# Patient Record
Sex: FEMALE | Race: WHITE | NOT HISPANIC OR LATINO | Employment: UNEMPLOYED | URBAN - METROPOLITAN AREA
[De-identification: names, ages, dates, MRNs, and addresses within clinical notes are randomized per-mention and may not be internally consistent; named-entity substitution may affect disease eponyms.]

---

## 2017-01-05 ENCOUNTER — HOSPITAL ENCOUNTER (OUTPATIENT)
Dept: ULTRASOUND IMAGING | Facility: HOSPITAL | Age: 57
Discharge: HOME | End: 2017-01-05

## 2017-01-05 ENCOUNTER — HOSPITAL ENCOUNTER (OUTPATIENT)
Dept: MAMMOGRAPHY | Facility: HOSPITAL | Age: 57
Discharge: HOME | End: 2017-01-05
Attending: SPECIALIST | Admitting: SPECIALIST

## 2017-01-05 DIAGNOSIS — R92.8 ABNORMAL MAMMOGRAM: ICD-10-CM

## 2017-01-05 PROCEDURE — G0279 TOMOSYNTHESIS, MAMMO: HCPCS

## 2017-01-05 PROCEDURE — 76641 ULTRASOUND BREAST COMPLETE: CPT | Performed by: RADIOLOGY

## 2017-01-05 PROCEDURE — 77061 BREAST TOMOSYNTHESIS UNI: CPT | Performed by: RADIOLOGY

## 2017-01-05 PROCEDURE — 76641 ULTRASOUND BREAST COMPLETE: CPT

## 2017-01-05 PROCEDURE — 77065 DX MAMMO INCL CAD UNI: CPT | Performed by: RADIOLOGY

## 2017-01-05 PROCEDURE — G0206 DX MAMMO INCL CAD UNI: HCPCS

## 2018-08-01 ENCOUNTER — TRANSCRIBE ORDERS (OUTPATIENT)
Dept: ADMINISTRATIVE | Facility: HOSPITAL | Age: 58
End: 2018-08-01

## 2018-08-01 DIAGNOSIS — Z12.31 VISIT FOR SCREENING MAMMOGRAM: Primary | ICD-10-CM

## 2018-09-05 ENCOUNTER — HOSPITAL ENCOUNTER (OUTPATIENT)
Dept: MAMMOGRAPHY | Facility: HOSPITAL | Age: 58
Discharge: HOME | End: 2018-09-05
Attending: SPECIALIST | Admitting: SPECIALIST

## 2018-09-05 DIAGNOSIS — Z12.31 VISIT FOR SCREENING MAMMOGRAM: ICD-10-CM

## 2018-09-05 PROCEDURE — 77067 SCR MAMMO BI INCL CAD: CPT | Performed by: RADIOLOGY

## 2018-09-05 PROCEDURE — 77063 BREAST TOMOSYNTHESIS BI: CPT

## 2018-09-05 PROCEDURE — 77067 SCR MAMMO BI INCL CAD: CPT

## 2018-09-05 PROCEDURE — 77063 BREAST TOMOSYNTHESIS BI: CPT | Performed by: RADIOLOGY

## 2020-06-05 ENCOUNTER — TRANSCRIBE ORDERS (OUTPATIENT)
Dept: ADMINISTRATIVE | Facility: HOSPITAL | Age: 60
End: 2020-06-05

## 2020-06-05 DIAGNOSIS — Z12.31 VISIT FOR SCREENING MAMMOGRAM: Primary | ICD-10-CM

## 2020-08-18 ENCOUNTER — HOSPITAL ENCOUNTER (OUTPATIENT)
Dept: MAMMOGRAPHY | Facility: HOSPITAL | Age: 60
Discharge: HOME | End: 2020-08-18
Admitting: SPECIALIST

## 2020-08-18 DIAGNOSIS — Z12.31 VISIT FOR SCREENING MAMMOGRAM: ICD-10-CM

## 2020-08-18 PROCEDURE — 77067 SCR MAMMO BI INCL CAD: CPT | Performed by: RADIOLOGY

## 2020-08-18 PROCEDURE — 77067 SCR MAMMO BI INCL CAD: CPT

## 2020-08-18 PROCEDURE — 77063 BREAST TOMOSYNTHESIS BI: CPT | Performed by: RADIOLOGY

## 2020-08-18 PROCEDURE — 77063 BREAST TOMOSYNTHESIS BI: CPT

## 2021-09-02 ENCOUNTER — TRANSCRIBE ORDERS (OUTPATIENT)
Dept: ADMINISTRATIVE | Facility: HOSPITAL | Age: 61
End: 2021-09-02

## 2021-09-02 DIAGNOSIS — Z12.31 VISIT FOR SCREENING MAMMOGRAM: Primary | ICD-10-CM

## 2021-10-01 ENCOUNTER — HOSPITAL ENCOUNTER (OUTPATIENT)
Dept: MAMMOGRAPHY | Facility: HOSPITAL | Age: 61
Discharge: HOME | End: 2021-10-01
Admitting: FAMILY MEDICINE

## 2021-10-01 DIAGNOSIS — Z12.31 VISIT FOR SCREENING MAMMOGRAM: ICD-10-CM

## 2021-10-01 PROCEDURE — 77063 BREAST TOMOSYNTHESIS BI: CPT

## 2021-10-01 PROCEDURE — 77067 SCR MAMMO BI INCL CAD: CPT | Performed by: RADIOLOGY

## 2021-10-01 PROCEDURE — 77067 SCR MAMMO BI INCL CAD: CPT

## 2021-10-01 PROCEDURE — 77063 BREAST TOMOSYNTHESIS BI: CPT | Performed by: RADIOLOGY

## 2021-11-03 ENCOUNTER — HOSPITAL ENCOUNTER (OUTPATIENT)
Dept: ULTRASOUND IMAGING | Facility: HOSPITAL | Age: 61
Discharge: HOME | End: 2021-11-03

## 2021-11-03 ENCOUNTER — HOSPITAL ENCOUNTER (OUTPATIENT)
Dept: MAMMOGRAPHY | Facility: HOSPITAL | Age: 61
Discharge: HOME | End: 2021-11-03

## 2021-11-03 DIAGNOSIS — R92.8 ABNORMAL MAMMOGRAM: ICD-10-CM

## 2021-11-03 PROCEDURE — 76642 ULTRASOUND BREAST LIMITED: CPT | Performed by: RADIOLOGY

## 2021-11-03 PROCEDURE — 77066 DX MAMMO INCL CAD BI: CPT

## 2021-11-03 PROCEDURE — 77066 DX MAMMO INCL CAD BI: CPT | Performed by: RADIOLOGY

## 2021-11-03 PROCEDURE — 76642 ULTRASOUND BREAST LIMITED: CPT

## 2021-11-03 PROCEDURE — G0279 TOMOSYNTHESIS, MAMMO: HCPCS

## 2021-11-03 PROCEDURE — 77062 BREAST TOMOSYNTHESIS BI: CPT | Performed by: RADIOLOGY

## 2023-01-16 ENCOUNTER — TRANSCRIBE ORDERS (OUTPATIENT)
Dept: ADMINISTRATIVE | Facility: HOSPITAL | Age: 63
End: 2023-01-16
Payer: COMMERCIAL

## 2023-01-16 DIAGNOSIS — Z12.31 VISIT FOR SCREENING MAMMOGRAM: Primary | ICD-10-CM

## 2023-02-15 ENCOUNTER — HOSPITAL ENCOUNTER (OUTPATIENT)
Dept: MAMMOGRAPHY | Facility: HOSPITAL | Age: 63
Discharge: HOME | End: 2023-02-15
Admitting: FAMILY MEDICINE
Payer: COMMERCIAL

## 2023-02-15 DIAGNOSIS — Z12.31 VISIT FOR SCREENING MAMMOGRAM: ICD-10-CM

## 2023-02-15 PROCEDURE — 77067 SCR MAMMO BI INCL CAD: CPT | Performed by: RADIOLOGY

## 2023-02-15 PROCEDURE — 77063 BREAST TOMOSYNTHESIS BI: CPT | Performed by: RADIOLOGY

## 2023-02-15 PROCEDURE — 77067 SCR MAMMO BI INCL CAD: CPT

## 2023-02-15 PROCEDURE — 77063 BREAST TOMOSYNTHESIS BI: CPT

## 2024-03-22 ENCOUNTER — LAB (OUTPATIENT)
Dept: LAB | Facility: HOSPITAL | Age: 64
End: 2024-03-22
Payer: COMMERCIAL

## 2024-03-22 ENCOUNTER — OFFICE VISIT (OUTPATIENT)
Dept: GYNECOLOGIC ONCOLOGY | Facility: CLINIC | Age: 64
End: 2024-03-22
Payer: COMMERCIAL

## 2024-03-22 VITALS
DIASTOLIC BLOOD PRESSURE: 89 MMHG | SYSTOLIC BLOOD PRESSURE: 167 MMHG | HEIGHT: 63 IN | WEIGHT: 105.2 LBS | TEMPERATURE: 97.3 F | HEART RATE: 79 BPM | OXYGEN SATURATION: 100 % | RESPIRATION RATE: 18 BRPM | BODY MASS INDEX: 18.64 KG/M2

## 2024-03-22 DIAGNOSIS — N94.89 TUBO-OVARIAN MASS: Primary | ICD-10-CM

## 2024-03-22 DIAGNOSIS — N94.89 TUBO-OVARIAN MASS: ICD-10-CM

## 2024-03-22 LAB — CANCER AG125 SERPL QL: 9.8 U/ML (ref 0–38.1)

## 2024-03-22 PROCEDURE — 36415 COLL VENOUS BLD VENIPUNCTURE: CPT

## 2024-03-22 PROCEDURE — 86304 IMMUNOASSAY TUMOR CA 125: CPT

## 2024-03-22 PROCEDURE — 99203 OFFICE O/P NEW LOW 30 MIN: CPT | Performed by: OBSTETRICS & GYNECOLOGY

## 2024-03-22 RX ORDER — ESTRADIOL 0.05 MG/D
1 PATCH, EXTENDED RELEASE TRANSDERMAL WEEKLY
COMMUNITY

## 2024-03-22 RX ORDER — LEVOTHYROXINE SODIUM 0.07 MG/1
75 TABLET ORAL DAILY
COMMUNITY

## 2024-03-22 RX ORDER — METOPROLOL SUCCINATE 25 MG/1
25 TABLET, EXTENDED RELEASE ORAL DAILY
COMMUNITY

## 2024-03-22 RX ORDER — PROGESTERONE 200 MG/1
200 CAPSULE ORAL DAILY
COMMUNITY

## 2024-03-22 RX ORDER — CYCLOSPORINE 0.5 MG/ML
1 EMULSION OPHTHALMIC EVERY 12 HOURS
COMMUNITY
Start: 2024-02-10

## 2024-03-22 NOTE — PROGRESS NOTES
Ingrid Ha  [id number]  1960      Reason for visit:  ovarian cyst    Consultation:  Patient is being seen at the request of Dr. Ha      History of present illness:  The patient is a 63 y.o. year old female who presents today for treatment and evaluation of the above issues.    Patient has hx of endometriosis and ovarian cysts. She had multiple dx lap with excision for endometriosis as she was trying to conceive children. Ovarian cysts have always been small and managed conservatively, she has never had medical or surgical management. She is on estrogen patch for menopause symptoms including bladder irritation (no culture proven UTI). She takes progesterone q3 months for endometrial protection.     She notes that she had some left sided abdominal pain for which she had TVUS completed. Ultrasound demonstrated normal appearing uterus, but mildly increased in size ovarian cysts. Comparison imaging in 2024. In January, R ovary normal. L ovary with 1.6 x 2.6 cm cyst, 2.1 x 12 cm cyst, and 0.9 x 1.9 cm cyst. In 2024, R ovary normal, left ovary with mildly increasing cyst, one measuring 2.7 x 1.1 cm and 2.5 x 2.0 cm.     Other than some mild pain, she is asymptomatic. Denies changes in weight, appetite, bowel/bladder function. Denies chest pain/SOB. Denies vaginal bleeding.     For new patients, PFS intake form from was reviewed and confirmed.    OBGYN History:  She is a .  She does use HRT, estrogen patch and prometrium q3 months for endometrial protection. She does have a history of abnormal pap smears with LEEP in , she had negative pap smear 10/11/21. No FH of ovarian/breast/colon cancer.       Oncologic History:  Oncology/Hematology History    No history exists.         Past Medical History:   Diagnosis Date    Hypothyroidism     PVC (premature ventricular contraction)        Past Surgical History:   Procedure Laterality Date     SECTION  1993    LAPAROSCOPIC  "LYSIS OF ADHESIONS  1990       MEDICATIONS:    Current Outpatient Medications:     cycloSPORINE (RESTASIS) 0.05 % ophthalmic emulsion, Administer 1 drop to both eyes Every 12 (Twelve) Hours., Disp: , Rfl:     estradiol (MINIVELLE, VIVELLE-DOT) 0.05 MG/24HR patch, Place 1 patch on the skin as directed by provider 1 (One) Time Per Week. 2 times a week, Disp: , Rfl:     levothyroxine (SYNTHROID, LEVOTHROID) 75 MCG tablet, Take 1 tablet by mouth Daily., Disp: , Rfl:     metoprolol succinate XL (TOPROL-XL) 25 MG 24 hr tablet, Take 1 tablet by mouth Daily., Disp: , Rfl:     Progesterone (PROMETRIUM) 200 MG capsule, Take 1 capsule by mouth Daily. 10 days every 3 month, Disp: , Rfl:      Allergies:  is allergic to valacyclovir.    Social History:   Social History     Socioeconomic History    Marital status:        Family History:    Family History   Problem Relation Age of Onset    Breast cancer Neg Hx     Ovarian cancer Neg Hx        Health Maintenance:    Health Maintenance   Topic Date Due    COLORECTAL CANCER SCREENING  Never done    RSV Vaccine - Adults (1 - 1-dose 60+ series) Never done    TDAP/TD VACCINES (2 - Td or Tdap) 03/25/2023    INFLUENZA VACCINE  08/01/2023    COVID-19 Vaccine (4 - 2023-24 season) 09/01/2023    HEPATITIS C SCREENING  Never done    ANNUAL PHYSICAL  Never done    PAP SMEAR  Never done    MAMMOGRAM  02/15/2025    ZOSTER VACCINE  Completed    Pneumococcal Vaccine 0-64  Aged Out         Review of Systems:   All other systems were reviewed and are negative except as mentioned above.    Physical Exam    Vitals:    03/22/24 1024   BP: 167/89   Pulse: 79   Resp: 18   Temp: 97.3 °F (36.3 °C)   TempSrc: Temporal   SpO2: 100%   Weight: 47.7 kg (105 lb 3.2 oz)   Height: 160 cm (63\")   PainSc: 0-No pain       Body mass index is 18.64 kg/m².    Wt Readings from Last 3 Encounters:   03/22/24 47.7 kg (105 lb 3.2 oz)       GENERAL: Alert, well-appearing female appearing her stated age who is in no " "apparent distress.   HEENT: Sclera anicteric. Head normocephalic, atraumatic. Mucus membranes moist.   NECK: Trachea midline, supple, without masses.  No thyromegaly.   BREASTS: Deferred  CARDIOVASCULAR: Normal rate, regular rhythm, no murmurs, rubs, or gallops.  No peripheral edema.  RESPIRATORY: Clear to auscultation bilaterally, normal respiratory effort  BACK:  No CVA tenderness, no vertebral tenderness on palpation  GASTROINTESTINAL:  Abdomen is soft, non-tender, non-distended, no rebound or guarding, no masses, or hernias. No HSM.    SKIN:  Warm, dry, well-perfused.  All visible areas intact.  No rashes, lesions, ulcers.  PSYCHIATRIC: AO x3, with appropriate affect, normal thought processes.  NEUROLOGIC: No focal deficits.  Moves extremities well.  MUSCULOSKELETAL: Normal gait and station.   EXTREMITIES:   No cyanosis, clubbing, symmetric.  LYMPHATICS:  No cervical or inguinal adenopathy noted.     PELVIC exam:     On bimanual examination no mass was appreciated.  Uterus was normal in size and shape. There is no cervical motion or uterine tenderness. No cervical mass was palpated. Parametria were smooth. Rectovaginal exam was deferred.     ECOG PS 0    PROCEDURES:  None    Diagnostic Data:      No Images in the past 120 days found..    No results found for: \"WBC\", \"HGB\", \"HCT\", \"MCV\", \"PLT\", \"NEUTROABS\", \"GLUCOSE\", \"BUN\", \"CREATININE\", \"EGFRIFNONA\", \"EGFRIFAFRI\", \"NA\", \"K\", \"CL\", \"CO2\", \"MG\", \"PHOS\", \"CALCIUM\", \"ALBUMIN\", \"AST\", \"ALT\", \"BILITOT\"  Lab Results   Component Value Date     9.8 03/22/2024           Assessment & Plan   This is a 63 y.o. woman with hx of endometriosis presenting for consultation for ovarian cysts.     Ovarian cysts   -hx of endometriosis, s/p multiple dx lap for excision   -now on estrogen HRT for hx of bladder discomfort (estrogen patches with cyclic withdrawal with Prometrium every 3 months)  - known hx of cysts, previously managed conservatively   - new pain so repeat TVUS was " completed   -Comparison imaging in January 2024. In January, R ovary normal. L ovary with 1.6 x 2.6 cm cyst, 2.1 x 12 cm cyst, and 0.9 x 1.9 cm cyst. In March 2024, R ovary normal, left ovary with mildly increasing cyst, one measuring 2.7 x 1.1 cm and 2.5 x 2.0 cm.  -no  available   -Dr. Bustamante reviewed images from Dr. Cruz clinic with patient, no concerning image or exam findings .  Images are now scanned into the EMR which are of better quality than the initial received images which were faxed over.  It somewhat difficult to tell if the increase in size is an adjacent cyst not previously included in the ovarian measurements.  Regardless, conservative management is indicated.  There were no findings of free fluid or other concerning findings regarding malignancy.  -discussed that patient could trial off of HRT to see if cyst size decreases, patient agreeable  -recommended obtaining  and repeat imaging in 3 months. Patient to call if she experiences worsening abdominal pain, changes to appetite or bowel/bladder habits, weight loss     Encounter Diagnosis   Name Primary?    Tubo-ovarian mass Yes       Pain assessment was performed today as a part of patient’s care.  For patients with pain related to surgery, gynecologic malignancy or cancer treatment, the plan is as noted in the assessment/plan.  For patients with pain not related to these issues, they are to seek any further needed care from a more appropriate provider, such as PCP.      Orders Placed This Encounter   Procedures    US Non-ob Transvaginal     Standing Status:   Future     Number of Occurrences:   1     Standing Expiration Date:   3/22/2025     Order Specific Question:   Reason for Exam:     Answer:   ovarian mass     Order Specific Question:   Release to patient     Answer:   Routine Release [[id number]]    IMAGING SCANNED    IMAGING SCANNED    IMAGING SCANNED         Standing Status:   Future     Number of Occurrences:   1      Standing Expiration Date:   3/22/2025     Order Specific Question:   Release to patient     Answer:   Routine Release [[id number]]       FOLLOW UP: 3 months    Melanie Paul MD   PGY3- OBGYN Resident   [facility]    I spent 35 minutes caring for Ingrid on this date of service. This time includes time spent by me in the following activities: preparing for the visit, reviewing tests, performing a medically appropriate examination and/or evaluation, counseling and educating the patient/family/caregiver, ordering medications, tests, or procedures, referring and communicating with other health care professionals, documenting information in the medical record, independently interpreting results and communicating that information with the patient/family/caregiver, and care coordination    Patient was seen and examined with Dr. Paul,  resident, who performed portions of the examination and documentation for this patient's care under my direct supervision.  I agree with the above documentation and plan.    Mandy Bustamante MD  03/24/24  16:34 EDT

## 2024-03-25 ENCOUNTER — TELEPHONE (OUTPATIENT)
Dept: GYNECOLOGIC ONCOLOGY | Facility: CLINIC | Age: 64
End: 2024-03-25
Payer: COMMERCIAL

## 2024-03-25 NOTE — TELEPHONE ENCOUNTER
----- Message from Mandy Bustamante MD sent at 3/23/2024  9:48 AM EDT -----  Please notify patient of normal ca 125  Thanks!  ----- Message -----  From: Lab, Background User  Sent: 3/22/2024   7:27 PM EDT  To: Mandy Bustamante MD

## 2024-04-10 ENCOUNTER — TRANSCRIBE ORDERS (OUTPATIENT)
Dept: ADMINISTRATIVE | Facility: HOSPITAL | Age: 64
End: 2024-04-10
Payer: COMMERCIAL

## 2024-04-10 DIAGNOSIS — Z12.31 VISIT FOR SCREENING MAMMOGRAM: Primary | ICD-10-CM

## 2024-05-15 ENCOUNTER — HOSPITAL ENCOUNTER (OUTPATIENT)
Dept: MAMMOGRAPHY | Facility: HOSPITAL | Age: 64
Discharge: HOME | End: 2024-05-15
Admitting: FAMILY MEDICINE
Payer: COMMERCIAL

## 2024-05-15 DIAGNOSIS — Z12.31 VISIT FOR SCREENING MAMMOGRAM: ICD-10-CM

## 2024-05-15 PROCEDURE — 77063 BREAST TOMOSYNTHESIS BI: CPT

## 2024-05-15 PROCEDURE — 77067 SCR MAMMO BI INCL CAD: CPT

## 2024-05-16 PROCEDURE — 77067 SCR MAMMO BI INCL CAD: CPT | Performed by: RADIOLOGY

## 2024-05-16 PROCEDURE — 77063 BREAST TOMOSYNTHESIS BI: CPT | Performed by: RADIOLOGY

## 2024-05-22 ENCOUNTER — LAB (OUTPATIENT)
Dept: LAB | Facility: HOSPITAL | Age: 64
End: 2024-05-22
Payer: COMMERCIAL

## 2024-05-22 ENCOUNTER — TRANSCRIBE ORDERS (OUTPATIENT)
Dept: LAB | Facility: HOSPITAL | Age: 64
End: 2024-05-22
Payer: COMMERCIAL

## 2024-05-22 DIAGNOSIS — K13.70 MOUTH LESION: Primary | ICD-10-CM

## 2024-05-22 DIAGNOSIS — K13.70 MOUTH LESION: ICD-10-CM

## 2024-05-22 LAB
ALBUMIN SERPL-MCNC: 4.3 G/DL (ref 3.5–5.2)
ALBUMIN/GLOB SERPL: 1.6 G/DL
ALP SERPL-CCNC: 49 U/L (ref 39–117)
ALT SERPL W P-5'-P-CCNC: 30 U/L (ref 1–33)
ANION GAP SERPL CALCULATED.3IONS-SCNC: 8 MMOL/L (ref 5–15)
AST SERPL-CCNC: 21 U/L (ref 1–32)
BASOPHILS # BLD AUTO: 0.03 10*3/MM3 (ref 0–0.2)
BASOPHILS NFR BLD AUTO: 0.7 % (ref 0–1.5)
BILIRUB SERPL-MCNC: 0.5 MG/DL (ref 0–1.2)
BUN SERPL-MCNC: 13 MG/DL (ref 8–23)
BUN/CREAT SERPL: 15.1 (ref 7–25)
CALCIUM SPEC-SCNC: 10.2 MG/DL (ref 8.6–10.5)
CHLORIDE SERPL-SCNC: 104 MMOL/L (ref 98–107)
CHROMATIN AB SERPL-ACNC: <10 IU/ML (ref 0–14)
CO2 SERPL-SCNC: 29 MMOL/L (ref 22–29)
CREAT SERPL-MCNC: 0.86 MG/DL (ref 0.57–1)
CRP SERPL-MCNC: <0.3 MG/DL (ref 0–0.5)
DEPRECATED RDW RBC AUTO: 43.7 FL (ref 37–54)
EGFRCR SERPLBLD CKD-EPI 2021: 76 ML/MIN/1.73
EOSINOPHIL # BLD AUTO: 0.01 10*3/MM3 (ref 0–0.4)
EOSINOPHIL NFR BLD AUTO: 0.2 % (ref 0.3–6.2)
ERYTHROCYTE [DISTWIDTH] IN BLOOD BY AUTOMATED COUNT: 11.9 % (ref 12.3–15.4)
ERYTHROCYTE [SEDIMENTATION RATE] IN BLOOD: 9 MM/HR (ref 0–30)
GLOBULIN UR ELPH-MCNC: 2.7 GM/DL
GLUCOSE SERPL-MCNC: 150 MG/DL (ref 65–99)
HCT VFR BLD AUTO: 37 % (ref 34–46.6)
HGB BLD-MCNC: 12.5 G/DL (ref 12–15.9)
IMM GRANULOCYTES # BLD AUTO: 0.01 10*3/MM3 (ref 0–0.05)
IMM GRANULOCYTES NFR BLD AUTO: 0.2 % (ref 0–0.5)
LYMPHOCYTES # BLD AUTO: 1.34 10*3/MM3 (ref 0.7–3.1)
LYMPHOCYTES NFR BLD AUTO: 30 % (ref 19.6–45.3)
MCH RBC QN AUTO: 33.7 PG (ref 26.6–33)
MCHC RBC AUTO-ENTMCNC: 33.8 G/DL (ref 31.5–35.7)
MCV RBC AUTO: 99.7 FL (ref 79–97)
MONOCYTES # BLD AUTO: 0.31 10*3/MM3 (ref 0.1–0.9)
MONOCYTES NFR BLD AUTO: 7 % (ref 5–12)
NEUTROPHILS NFR BLD AUTO: 2.76 10*3/MM3 (ref 1.7–7)
NEUTROPHILS NFR BLD AUTO: 61.9 % (ref 42.7–76)
NRBC BLD AUTO-RTO: 0 /100 WBC (ref 0–0.2)
PLATELET # BLD AUTO: 205 10*3/MM3 (ref 140–450)
PMV BLD AUTO: 10.8 FL (ref 6–12)
POTASSIUM SERPL-SCNC: 3.5 MMOL/L (ref 3.5–5.2)
PROT SERPL-MCNC: 7 G/DL (ref 6–8.5)
RBC # BLD AUTO: 3.71 10*6/MM3 (ref 3.77–5.28)
SODIUM SERPL-SCNC: 141 MMOL/L (ref 136–145)
WBC NRBC COR # BLD AUTO: 4.46 10*3/MM3 (ref 3.4–10.8)

## 2024-05-22 PROCEDURE — 85652 RBC SED RATE AUTOMATED: CPT

## 2024-05-22 PROCEDURE — 86037 ANCA TITER EACH ANTIBODY: CPT

## 2024-05-22 PROCEDURE — 85025 COMPLETE CBC W/AUTO DIFF WBC: CPT

## 2024-05-22 PROCEDURE — 86431 RHEUMATOID FACTOR QUANT: CPT

## 2024-05-22 PROCEDURE — 36415 COLL VENOUS BLD VENIPUNCTURE: CPT

## 2024-05-22 PROCEDURE — 80053 COMPREHEN METABOLIC PANEL: CPT

## 2024-05-22 PROCEDURE — 83516 IMMUNOASSAY NONANTIBODY: CPT

## 2024-05-22 PROCEDURE — 86038 ANTINUCLEAR ANTIBODIES: CPT

## 2024-05-22 PROCEDURE — 86140 C-REACTIVE PROTEIN: CPT

## 2024-05-23 ENCOUNTER — TELEPHONE (OUTPATIENT)
Dept: GYNECOLOGIC ONCOLOGY | Facility: CLINIC | Age: 64
End: 2024-05-23
Payer: COMMERCIAL

## 2024-05-23 LAB — ANA SER QL: NEGATIVE

## 2024-05-23 NOTE — TELEPHONE ENCOUNTER
Caller: Ingrid Ha    Relationship to patient: Self    Best call back number: [phone]    Chief complaint: R/S    Type of visit: ULTRASOUND     Requested date: 6-27, 6-28, 7-5, 7-8, 7-9, 7-11, 7-12    If rescheduling, when is the original appointment: 6-

## 2024-05-24 LAB
C-ANCA TITR SER IF: NORMAL TITER
MYELOPEROXIDASE AB SER IA-ACNC: <0.2 UNITS (ref 0–0.9)
P-ANCA ATYPICAL TITR SER IF: NORMAL TITER
P-ANCA TITR SER IF: NORMAL TITER
PROTEINASE3 AB SER IA-ACNC: <0.2 UNITS (ref 0–0.9)

## 2024-06-26 NOTE — PROGRESS NOTES
"Ingrid Mahajan Leland  [id number]  1960      Reason for visit:  ovarian cyst      History of present illness:  The patient is a 63 y.o. year old female who presents today for treatment and evaluation of the above issues.  From prior note 3/22/2024:  \"Patient has hx of endometriosis and ovarian cysts. She had multiple dx lap with excision for endometriosis as she was trying to conceive children. Ovarian cysts have always been small and managed conservatively, she has never had medical or surgical management. She is on estrogen patch for menopause symptoms including bladder irritation (no culture proven UTI). She takes progesterone q3 months for endometrial protection.     She notes that she had some left sided abdominal pain for which she had TVUS completed. Ultrasound demonstrated normal appearing uterus, but mildly increased in size ovarian cysts. Comparison imaging in 2024. In January, R ovary normal. L ovary with 1.6 x 2.6 cm cyst, 2.1 x 12 cm cyst, and 0.9 x 1.9 cm cyst. In 2024, R ovary normal, left ovary with mildly increasing cyst, one measuring 2.7 x 1.1 cm and 2.5 x 2.0 cm.\"  =========================  Since last appt, notes no mild pain, she is asymptomatic. Denies changes in weight, appetite, bowel/bladder function. Denies chest pain/SOB. Notes minimal dark spotting over last 2 months.  She has not resumed hormone replacement since last appt .      Oncologic History:  Oncology/Hematology History    No history exists.         Past Medical History:   Diagnosis Date    Hypothyroidism     PVC (premature ventricular contraction)        Past Surgical History:   Procedure Laterality Date     SECTION  1993    LAPAROSCOPIC LYSIS OF ADHESIONS         MEDICATIONS:    Current Outpatient Medications:     cycloSPORINE (RESTASIS) 0.05 % ophthalmic emulsion, Administer 1 drop to both eyes Every 12 (Twelve) Hours., Disp: , Rfl:     levothyroxine (SYNTHROID, LEVOTHROID) 75 MCG tablet, Take " "1 tablet by mouth Daily., Disp: , Rfl:     metoprolol succinate XL (TOPROL-XL) 25 MG 24 hr tablet, Take 1 tablet by mouth Daily., Disp: , Rfl:      Allergies:  is allergic to valacyclovir.    Social History:   Social History     Socioeconomic History    Marital status:        Family History:    Family History   Problem Relation Age of Onset    Breast cancer Neg Hx     Ovarian cancer Neg Hx        Health Maintenance:    Health Maintenance   Topic Date Due    BMI FOLLOWUP  Never done    COLORECTAL CANCER SCREENING  Never done    RSV Vaccine - Adults (1 - 1-dose 60+ series) Never done    TDAP/TD VACCINES (2 - Td or Tdap) 03/25/2023    COVID-19 Vaccine (4 - 2023-24 season) 09/01/2023    HEPATITIS C SCREENING  Never done    ANNUAL PHYSICAL  Never done    PAP SMEAR  Never done    INFLUENZA VACCINE  08/01/2024    MAMMOGRAM  05/15/2026    ZOSTER VACCINE  Completed    Pneumococcal Vaccine 0-64  Aged Out         Review of Systems:   All other systems were reviewed and are negative except as mentioned above.    Physical Exam    Vitals:    06/28/24 1047   BP: 178/88   Pulse: 68   Resp: 18   Temp: 97.1 °F (36.2 °C)   TempSrc: Infrared   SpO2: 100%   Weight: 46.7 kg (102 lb 14.4 oz)   Height: 160 cm (63\")   PainSc: 0-No pain         Body mass index is 18.23 kg/m².    Wt Readings from Last 3 Encounters:   06/28/24 46.7 kg (102 lb 14.4 oz)   03/22/24 47.7 kg (105 lb 3.2 oz)       GENERAL: Alert, well-appearing female appearing her stated age who is in no apparent distress.   HEENT: Sclera anicteric. Head normocephalic, atraumatic. Mucus membranes moist.   BREASTS: Deferred  CARDIOVASCULAR:  No peripheral edema.  RESPIRATORY:  normal respiratory effort  GASTROINTESTINAL:  Abdomen is soft, non-tender, non-distended, no rebound or guarding, no masses, or hernias.   SKIN:  Warm, dry, well-perfused.  All visible areas intact.  No rashes, lesions, ulcers.  PSYCHIATRIC: AO x3, with appropriate affect, normal thought " processes.  NEUROLOGIC: No focal deficits.  Moves extremities well.  MUSCULOSKELETAL: Normal gait and station.   EXTREMITIES:   No cyanosis, clubbing, symmetric.     PELVIC exam: Deferred    ECOG PS 0    PROCEDURES: Transvaginal ultrasound was performed.  Please refer to report.    Diagnostic Data:      No Images in the past 120 days found..    Lab Results   Component Value Date    WBC 4.46 05/22/2024    HGB 12.5 05/22/2024    HCT 37.0 05/22/2024    MCV 99.7 (H) 05/22/2024     05/22/2024    NEUTROABS 2.76 05/22/2024    GLUCOSE 150 (H) 05/22/2024    BUN 13 05/22/2024    CREATININE 0.86 05/22/2024     05/22/2024    K 3.5 05/22/2024     05/22/2024    CO2 29.0 05/22/2024    CALCIUM 10.2 05/22/2024    ALBUMIN 4.3 05/22/2024    AST 21 05/22/2024    ALT 30 05/22/2024    BILITOT 0.5 05/22/2024     Lab Results   Component Value Date     12.8 06/28/2024     9.8 03/22/2024           Assessment & Plan   This is a 63 y.o. woman with history of endometriosis and hormone replacement presenting for consultation for ovarian cysts.   Encounter Diagnoses   Name Primary?    Tubo-ovarian mass Yes    History of endometriosis     History of postmenopausal HRT        Ovarian cysts   -hx of endometriosis, s/p multiple dx lap for excision   -Previously on estrogen HRT for hx of bladder discomfort (estrogen patches with cyclic withdrawal with Prometrium every 3 months) and patient was counseled to discontinue at last visit March 2024  -known hx of cysts, previously managed conservatively   -Comparison imaging in January 2024. In January, R ovary normal. L ovary with 1.6 x 2.6 cm cyst, 2.1 x 12 cm cyst, and 0.9 x 1.9 cm cyst. In March 2024, R ovary normal, left ovary with mildly increasing cyst, one measuring 2.7 x 1.1 cm and 2.5 x 2.0 cm.  -Repeat ultrasound today shows 5mm stripe, ovaries appear wnl. Discussed that with PMB will need endometrial sampling at next appt if PMB persists 3 months from now.  She is to  call for an appointment if needed.  Dr. Bustamante discussed this with referring physician.  Both agreed this was a reasonable approach.  However, if patient's symptoms should worsen she should come back in a sooner timeframe.    Pain assessment was performed today as a part of patient’s care.  For patients with pain related to surgery, gynecologic malignancy or cancer treatment, the plan is as noted in the assessment/plan.  For patients with pain not related to these issues, they are to seek any further needed care from a more appropriate provider, such as PCP.      Orders Placed This Encounter   Procedures    US Non-ob Transvaginal     Standing Status:   Future     Number of Occurrences:   1     Standing Expiration Date:   6/27/2025     Order Specific Question:   Reason for Exam:     Answer:   follow up of ovarian cyst     Order Specific Question:   Release to patient     Answer:   Routine Release [[id number]]         Standing Status:   Future     Number of Occurrences:   1     Standing Expiration Date:   6/27/2025     Order Specific Question:   Release to patient     Answer:   Routine Release [[id number]]     FOLLOW UP: MAYELIN Vigil MD   PGY3- OBGYN Resident   [facility]    I spent 14 minutes caring for Ingrid on this date of service. This time includes time spent by me in the following activities: preparing for the visit, reviewing tests, performing a medically appropriate examination and/or evaluation, counseling and educating the patient/family/caregiver, referring and communicating with other health care professionals, documenting information in the medical record, care coordination, and ordering procedure(s)  I spent 16 minutes on the separately reported service of transvaginal ultrasound. This time is not included in the time used to support the E/M service also reported today.    Patient was seen and examined with Dr. Vigil,  resident, who performed portions of  the examination and documentation for this patient's care under my direct supervision.  I agree with the above documentation and plan.    Mandy Bustamante MD  06/30/24  18:32 EDT

## 2024-06-27 PROBLEM — Z92.29 HISTORY OF POSTMENOPAUSAL HRT: Status: ACTIVE | Noted: 2024-06-27

## 2024-06-27 PROBLEM — Z87.42 HISTORY OF ENDOMETRIOSIS: Status: ACTIVE | Noted: 2024-06-27

## 2024-06-28 ENCOUNTER — TELEPHONE (OUTPATIENT)
Dept: GYNECOLOGIC ONCOLOGY | Facility: CLINIC | Age: 64
End: 2024-06-28

## 2024-06-28 ENCOUNTER — OFFICE VISIT (OUTPATIENT)
Dept: GYNECOLOGIC ONCOLOGY | Facility: CLINIC | Age: 64
End: 2024-06-28
Payer: COMMERCIAL

## 2024-06-28 ENCOUNTER — LAB (OUTPATIENT)
Dept: LAB | Facility: HOSPITAL | Age: 64
End: 2024-06-28
Payer: COMMERCIAL

## 2024-06-28 VITALS
WEIGHT: 102.9 LBS | DIASTOLIC BLOOD PRESSURE: 88 MMHG | TEMPERATURE: 97.1 F | OXYGEN SATURATION: 100 % | HEART RATE: 68 BPM | BODY MASS INDEX: 18.23 KG/M2 | SYSTOLIC BLOOD PRESSURE: 178 MMHG | HEIGHT: 63 IN | RESPIRATION RATE: 18 BRPM

## 2024-06-28 DIAGNOSIS — N94.89 TUBO-OVARIAN MASS: Primary | ICD-10-CM

## 2024-06-28 DIAGNOSIS — Z87.42 HISTORY OF ENDOMETRIOSIS: ICD-10-CM

## 2024-06-28 DIAGNOSIS — Z92.29 HISTORY OF POSTMENOPAUSAL HRT: ICD-10-CM

## 2024-06-28 DIAGNOSIS — N94.89 TUBO-OVARIAN MASS: ICD-10-CM

## 2024-06-28 LAB — CANCER AG125 SERPL QL: 12.8 U/ML (ref 0–38.1)

## 2024-06-28 PROCEDURE — 36415 COLL VENOUS BLD VENIPUNCTURE: CPT

## 2024-06-28 PROCEDURE — 86304 IMMUNOASSAY TUMOR CA 125: CPT

## 2024-06-28 PROCEDURE — 99212 OFFICE O/P EST SF 10 MIN: CPT | Performed by: OBSTETRICS & GYNECOLOGY

## 2024-06-28 NOTE — TELEPHONE ENCOUNTER
RN called and left voice mail about having lab results. Rn left office number. Someone from the clinical staff will attempt to call patient again on Monday.  ----- Message from Mandy Bustamante sent at 6/28/2024  3:49 PM EDT -----  Please notify patient of normal .  Thank you!  ----- Message -----  From: Lab, Background User  Sent: 6/28/2024   2:41 PM EDT  To: Mandy Bustamante MD

## 2024-06-28 NOTE — TELEPHONE ENCOUNTER
Patient called back and RN was able to notify her of the  level was low and normal.   ----- Message from Mandy Bustamante sent at 6/28/2024  3:49 PM EDT -----  Please notify patient of normal .  Thank you!  ----- Message -----  From: Lab, Background User  Sent: 6/28/2024   2:41 PM EDT  To: Mandy Bustamante MD

## 2024-09-20 ENCOUNTER — OFFICE VISIT (OUTPATIENT)
Dept: GYNECOLOGIC ONCOLOGY | Facility: CLINIC | Age: 64
End: 2024-09-20
Payer: COMMERCIAL

## 2024-09-20 VITALS
TEMPERATURE: 98 F | OXYGEN SATURATION: 100 % | BODY MASS INDEX: 18.29 KG/M2 | HEIGHT: 63 IN | RESPIRATION RATE: 18 BRPM | DIASTOLIC BLOOD PRESSURE: 83 MMHG | WEIGHT: 103.2 LBS | HEART RATE: 55 BPM | SYSTOLIC BLOOD PRESSURE: 161 MMHG

## 2024-09-20 DIAGNOSIS — N94.89 TUBO-OVARIAN MASS: Primary | ICD-10-CM
